# Patient Record
Sex: MALE | Race: WHITE | Employment: FULL TIME | URBAN - METROPOLITAN AREA
[De-identification: names, ages, dates, MRNs, and addresses within clinical notes are randomized per-mention and may not be internally consistent; named-entity substitution may affect disease eponyms.]

---

## 2021-11-01 ENCOUNTER — APPOINTMENT (OUTPATIENT)
Dept: GENERAL RADIOLOGY | Age: 21
End: 2021-11-01
Payer: COMMERCIAL

## 2021-11-01 ENCOUNTER — HOSPITAL ENCOUNTER (EMERGENCY)
Age: 21
Discharge: HOME | End: 2021-11-01
Payer: COMMERCIAL

## 2021-11-01 VITALS
DIASTOLIC BLOOD PRESSURE: 69 MMHG | TEMPERATURE: 97.4 F | RESPIRATION RATE: 16 BRPM | HEART RATE: 107 BPM | OXYGEN SATURATION: 99 % | HEIGHT: 70 IN | BODY MASS INDEX: 16.46 KG/M2 | WEIGHT: 115 LBS | SYSTOLIC BLOOD PRESSURE: 125 MMHG

## 2021-11-01 DIAGNOSIS — S82.55XA NONDISPLACED FRACTURE OF MEDIAL MALLEOLUS OF LEFT TIBIA, INITIAL ENCOUNTER FOR CLOSED FRACTURE: Primary | ICD-10-CM

## 2021-11-01 PROCEDURE — 29515 APPLICATION SHORT LEG SPLINT: CPT

## 2021-11-01 PROCEDURE — 99282 EMERGENCY DEPT VISIT SF MDM: CPT

## 2021-11-01 PROCEDURE — 73610 X-RAY EXAM OF ANKLE: CPT

## 2021-11-01 PROCEDURE — 73630 X-RAY EXAM OF FOOT: CPT

## 2021-11-01 PROCEDURE — 96372 THER/PROPH/DIAG INJ SC/IM: CPT

## 2021-11-01 PROCEDURE — 6360000002 HC RX W HCPCS: Performed by: PHYSICIAN ASSISTANT

## 2021-11-01 RX ORDER — KETOROLAC TROMETHAMINE 30 MG/ML
30 INJECTION, SOLUTION INTRAMUSCULAR; INTRAVENOUS ONCE
Status: COMPLETED | OUTPATIENT
Start: 2021-11-01 | End: 2021-11-01

## 2021-11-01 RX ORDER — IBUPROFEN 800 MG/1
800 TABLET ORAL 2 TIMES DAILY PRN
Qty: 30 TABLET | Refills: 0 | Status: SHIPPED | OUTPATIENT
Start: 2021-11-01 | End: 2021-11-16

## 2021-11-01 RX ADMIN — KETOROLAC TROMETHAMINE 30 MG: 30 INJECTION, SOLUTION INTRAMUSCULAR at 17:16

## 2021-11-01 ASSESSMENT — PAIN SCALES - GENERAL: PAINLEVEL_OUTOF10: 3

## 2021-11-01 NOTE — Clinical Note
Smita Alcazar was seen and treated in our emergency department on 11/1/2021. He may return to work on 11/08/2021. If you have any questions or concerns, please don't hesitate to call.       Joey Mcgee PA-C

## 2021-11-01 NOTE — ED PROVIDER NOTES
[address]  Department of Emergency Medicine   ED  Encounter Note  Admit Date/RoomTime: 2021  4:56 PM  ED Room: /    NAME: Jurgen Middleton  : 2000  MRN: [id number]     Chief Complaint:  Foot Injury (left, \"the other day at work\", dropped a metal barrel full of sand onto foot, does not want to file workers comp) and Work Related Injury    History of Present Illness         Jurgen Middleton is a 19 y.o. old male presenting to the emergency department by private vehicle, for traumatic Left foot and ankle pain which occured a few day(s) prior to arrival.  The complaint is due to a crush injury while at work. Since onset the symptoms have been persistent with ability to bear weight, but with some pain. Patient has no prior history of pain/injury with regards to today's visit. His pain is aggraveated by any movement or pressure on or palpation of painful area and relieved by nothing. He denies any head injury, headache, loss of consciousness, confusion, dizziness, neck pain, chest pain, abdominal pain, back pain, numbness, weakness, blurred vision, nausea, vomiting, fever, chills, wounds or rash. Tetanus Status: unknown. ROS   Pertinent positives and negatives are stated within HPI, all other systems reviewed and are negative. Past Medical History:  has no past medical history on file. Surgical History:  has no past surgical history on file. Social History:      Family History: family history is not on file. Allergies: Patient has no known allergies. Physical Exam   Oxygen Saturation Interpretation: Normal.        ED Triage Vitals [21 1653]   BP Temp Temp Source Pulse Resp SpO2 Height Weight   125/69 97.4 °F (36.3 °C) Temporal 116 16 99 % 5' 10\" (1.778 m) 115 lb (52.2 kg)         Constitutional:  Alert, development consistent with age. Neck:  Normal ROM. Supple. Left Ankle: Medial malleolus              Tenderness:  moderate. Time: 3012    SPLINT  APPLICATION  Risks, benefits and alternatives (for applicable procedures below) described. Performed By: Aisha Foley PA-C. Indication:  fracture of left ankle . Procedure:   A short  left leg  Medial splint was applied by me. The patient tolerated the procedure well. MDM:     Patient is a 68-year-old male who presented to the emergency department after crushing injury that occurred at work. Patient does not want this is a Workmen's Comp. injury. Patient had a thorough examination and had swelling of the medial ankle. Patient had good DP/PT pulse. Imaging was obtained based on moderate suspicion for fracture / bony abnormality, dislocation as per history/physical findings. Patient was found to have an intra-articular medial malleolus fracture. Ankle mortise is intact. Patient was placed in a stirrup and posterior splint crutches were given. Patient was educated bedside symptoms of compartment syndrome. Patient was explained the need for close follow-up with orthopedic specialist.  Patient is neurovascular and sensory intact and has a good cap refill. Patient has no swelling along the calf. Patient was told not to bear any weight on it until he is seen and cleared by an orthopedic. He voiced understanding and agreed with the plan management. Plan is subsequently for symptom control, limited use and  immobilization with appropriate outpatient follow-up. Patient was explicitly instructed on specific signs and symptoms on which to return to the emergency room for. Patient was instructed to return to the ER for any new or worsening symptoms. Additional discharge instructions were given verbally. All questions were answered. Patient is comfortable and agreeable with discharge plan. Patient in no acute distress and non-toxic in appearance.        Plan of Care/Counseling:  Aisha Foley PA-C reviewed today's visit with the patient in addition to providing specific details for the plan of care and counseling regarding the diagnosis and prognosis. Questions are answered at this time and are agreeable with the plan. Assessment      1. Nondisplaced fracture of medial malleolus of left tibia, initial encounter for closed fracture      Plan   Discharged home. Patient condition is stable    New Medications     New Prescriptions    IBUPROFEN (ADVIL;MOTRIN) 800 MG TABLET    Take 1 tablet by mouth 2 times daily as needed for Pain     Electronically signed by Rishi Cintron PA-C   DD: 11/1/21  **This report was transcribed using voice recognition software. Every effort was made to ensure accuracy; however, inadvertent computerized transcription errors may be present.   END OF ED PROVIDER NOTE       Rishi Cintron PA-C  11/01/21 1761